# Patient Record
Sex: MALE | Race: WHITE | Employment: STUDENT | URBAN - NONMETROPOLITAN AREA
[De-identification: names, ages, dates, MRNs, and addresses within clinical notes are randomized per-mention and may not be internally consistent; named-entity substitution may affect disease eponyms.]

---
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
## 2020-03-22 ENCOUNTER — TELEPHONE (OUTPATIENT)
Dept: URGENT CARE | Facility: CLINIC | Age: 20
End: 2020-03-22

## 2020-03-22 NOTE — TELEPHONE ENCOUNTER
Mother called stating that child went to [place] this week and arrived home and has a fever.  She gave him tylenol last night and wondered if she should bring him in for a flu test or not.  I explained the best thing to do right now was to stay in.  Mother explained she understood that staying inside was the best bet and she understood he doesn't meet requirements for testing.  Mother did ask what exactly she needed to do.  She is a caregiver to immunocompromised people.  I explained that with him just having a fever and her having no symptoms that she would need to speak to her employer.  She was very appreciative and understanding of what I told her.  I offered to send her to the nurse line to have their opinion, she declined to be transferred.      Lissa Valiente RN 3/22/2020 09:31

## 2020-09-18 ENCOUNTER — TELEPHONE (OUTPATIENT)
Dept: ADMINISTRATIVE | Age: 20
End: 2020-09-18

## 2020-09-18 NOTE — TELEPHONE ENCOUNTER
Pt mom called trying to change PCP from [facility] to our office. She prefers Dr. Denisha Basilio first due to recommendation and Dr. Monserrat Sylvester if we cant use Dr. Denisha Basilio. No Weds or Thurs and they need a mid morning or afternoon appt due to school.

## 2020-09-26 NOTE — TELEPHONE ENCOUNTER
Called and left message for mom. . . Let her know Dr. Len Quan isnt taking new patients right now but there is several in the office that is and She can call back on Monday and I would be happy to get her on the schedule.

## 2024-03-16 ENCOUNTER — OFFICE VISIT (OUTPATIENT)
Age: 24
End: 2024-03-16

## 2024-03-16 VITALS
WEIGHT: 245 LBS | SYSTOLIC BLOOD PRESSURE: 132 MMHG | HEART RATE: 64 BPM | RESPIRATION RATE: 20 BRPM | TEMPERATURE: 98.4 F | BODY MASS INDEX: 31.56 KG/M2 | OXYGEN SATURATION: 98 % | DIASTOLIC BLOOD PRESSURE: 90 MMHG

## 2024-03-16 DIAGNOSIS — Z75.8 DOES NOT HAVE PRIMARY CARE PROVIDER: ICD-10-CM

## 2024-03-16 DIAGNOSIS — R03.0 ELEVATED BLOOD PRESSURE READING: Primary | ICD-10-CM

## 2024-03-16 ASSESSMENT — ENCOUNTER SYMPTOMS
CHEST TIGHTNESS: 0
EYE DISCHARGE: 0
EYE ITCHING: 0
SORE THROAT: 0
RHINORRHEA: 0
NAUSEA: 0
BACK PAIN: 0
VOMITING: 0
WHEEZING: 0
ABDOMINAL PAIN: 0
DIARRHEA: 0
SINUS PAIN: 0
EYE REDNESS: 0
ALLERGIC/IMMUNOLOGIC NEGATIVE: 1
COUGH: 0
SHORTNESS OF BREATH: 0
CONSTIPATION: 0

## 2024-03-16 NOTE — PROGRESS NOTES
music, utilize essential oils such as lavender. If these are ineffective, follow up with therapist.     Any condition can change, despite proper treatment. Therefore, if symptoms still persist or worsen after treatment plan intitated today, patient is to go to the nearest ER, call PCP, or return to urgent care for further evaluation. Urgent Care evaluation today is not a substitute for PCP visit. Follow up care is the patient's responsibility to discuss and review this UC visit.      Electronically signed by JESÚS Larson NP on 3/16/2024 at 1:50 PM    EMR Dragon/translation disclaimer: Much of this encounter note is an electronic transcription/translation of spoken language to printed text.  The electronic translation of spoken language may be erroneous, or at times, nonsensical words or phrases may be inadvertently transcribed.  Although I have reviewed the note for such errors, some may still exist.

## 2024-03-16 NOTE — PATIENT INSTRUCTIONS
[facility] referral initiated  Go to ER if experiencing chest pain, palpitations, shortness of breath, lightheadedness, dizziness, vision changes, headache.    Lifestyle modifications to reduce blood pressure: adhere to a cardiac diet, weight loss, increasing physical activity, limiting alcohol consumption.  Begin checking blood pressure at home daily.  Try anxiety reduction techniques, such as meditating, lying down in a dark/quiet room, coloring/drawing, getting outside more, journaling, listen to music, utilize essential oils such as lavender. If these are ineffective, follow up with therapist.     Any condition can change, despite proper treatment. Therefore, if symptoms still persist or worsen after treatment plan intitated today, patient is to go to the nearest ER, call PCP, or return to urgent care for further evaluation. Urgent Care evaluation today is not a substitute for PCP visit. Follow up care is the patient's responsibility to discuss and review this UC visit.